# Patient Record
(demographics unavailable — no encounter records)

---

## 2025-06-04 NOTE — HISTORY OF PRESENT ILLNESS
[Dull/Aching] : dull/aching [Localized] : localized [Tightness] : tightness [de-identified] : 06/04/2025: RHD 33 yo male who is an avid bowler who has noted right thumb and middle finger IP and DIP joint pain / swelling over the past 1-1.5 yrs. There is no hx of trauma. Pt denies numbness/tingling  PMH: denied Allergies: Zithromax (hives) / cortisone injections (anxiety) Occupation: [employer] ([employer]) [FreeTextEntry1] : RT hand pain [FreeTextEntry5] : RT hand pain that developed 2 weeks ago

## 2025-06-04 NOTE — ASSESSMENT
[FreeTextEntry1] : The patient was advised of the diagnosis. The natural history of the pathology was explained in full to the patient in layman's terms. All questions were answered. The risks and benefits of surgical and non-surgical treatment alternatives were explained in full to the patient.  Pt recommended Voltaren Gel qid to the affected digits. Bowling ball modification.  Pt will rto prn.

## 2025-06-04 NOTE — IMAGING
[de-identified] : right thumb with IP joint ulnar sided swelling.  DIP joint swelling to the radial side all digits are nvi with farom  and intrinsic strength is 5/5. TTP over middle finger DIP joint radial side There is no ligamentous laxity to any digit right wrist with full and pain free ROM / no ttp.  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